# Patient Record
Sex: FEMALE | Race: WHITE
[De-identification: names, ages, dates, MRNs, and addresses within clinical notes are randomized per-mention and may not be internally consistent; named-entity substitution may affect disease eponyms.]

---

## 2018-11-16 NOTE — RAD
DATE: 11/15/2018 5:30 PM



EXAM: MAMMO HERMILA SCREENING BILATERAL



HISTORY: routine screening evaluation. History of bilateral breast reduction 



COMPARISON: Prior mammographic imaging dating back to 6/21/2012



Bilateral full field craniocaudal and mediolateral oblique images were obtained 
using digital technique.



This study was interpreted with the benefit of Computerized Aided Detection (CAD
).



Breast Density: The breast parenchyma shows scattered fibroglandular densities. 
Breast parenchyma level B.



FINDINGS:

Benign calcifications are present. The parenchymal pattern appears stable. 
Changes from prior breast reduction are also grossly stable.



No suspicious masses, microcalcifications or architectural distortion is 
present to suggest malignancy in either breast.



The visualized axillae are unremarkable. 



IMPRESSION: No mammographic evidence of malignancy. 



BI-RADS CATEGORY: 2 BENIGN FINDING(S)



RECOMMENDED FOLLOW-UP: 12M 12 MONTH FOLLOW-UP Annual screening mammography is 
recommended, unless clinically indicated sooner based on symptoms or change in 
physical exam.



PQRS compliance statement: Patient information was entered into a reminder 
system with a target due date 11/17/2019 for the next mammogram.



Mammography is a sensitive method for finding small breast cancers, but it does 
not detect them all and is not a substitute for careful clinical examination. A 
negative mammogram does not negate a clinically suspicious finding and should 
not result in delay in biopsying a clinically suspicious abnormality.



"Our facility is accredited by the [employer] Mammography 
Program."
ISMAELD

## 2020-11-16 NOTE — RAD
DATE: 11/13/2020 2:30 PM



EXAM: DIGITAL SCREEN BILAT W/CAD



HISTORY:  Screening



COMPARISON: 11/15/2018, 12/2/2015, 6/21/2012



Bilateral CC and MLO views of the breasts were performed. Bilateral breast

tomosynthesis was performed in CC and MLO projections.



This study was interpreted with the benefit of Computerized Aided Detection

(CAD).





FINDINGS:



Breast Density: FATTY  The Breast Parenchyma is primarily fatty replaced.

Breast parenchyma level density A.



Stable benign bilateral postreduction changes. 

No suspicious masses, microcalcifications or architectural distortion is

present to suggest malignancy in either breast.





The visualized axillae are unremarkable. 



IMPRESSION: No mammographic evidence of malignancy. 



BI-RADS CATEGORY: 2 BENIGN FINDING(S)



RECOMMENDED FOLLOW-UP: 12M 12 MONTH FOLLOW-UP Annual screening mammography is

recommended, unless clinically indicated sooner based on symptoms or change in

physical exam.



PQRS compliance statement: Patient information was entered into a reminder

system with a target due date for the next mammogram.



Mammography is a sensitive method for finding small breast cancers, but it

does not detect them all and is not a substitute for careful clinical

examination.  A negative mammogram does not negate a clinically suspicious

finding and should not result in delay in biopsying a clinically suspicious

abnormality.



"Our facility is accredited by the [employer] Mammography

Program."

## 2021-11-22 ENCOUNTER — HOSPITAL ENCOUNTER (OUTPATIENT)
Dept: HOSPITAL 63 - MAMMO | Age: 51
End: 2021-11-22
Attending: NURSE PRACTITIONER
Payer: COMMERCIAL

## 2021-11-22 DIAGNOSIS — Z12.31: Primary | ICD-10-CM

## 2021-11-22 PROCEDURE — 77063 BREAST TOMOSYNTHESIS BI: CPT

## 2021-11-22 PROCEDURE — 77067 SCR MAMMO BI INCL CAD: CPT

## 2021-11-22 NOTE — RAD
INDICATION: 51 years of age asymptomatic female patient presents for screening mammography.



TECHNIQUE:  Full field craniocaudal and mediolateral oblique images of both breasts were obtained usi
ng digital technique with tomosynthesis and also analyzed with computer-aided detection software.



COMPARISON: Prior mammographic imaging dating back to  .



BREAST COMPOSITION: Category B: There are scattered fibroglandular densities.



FINDINGS:

Benign calcifications are present.  Benign calcifications are present.



No suspicious masses, microcalcifications or architectural distortion is present to suggest malignanc
y in either breast.



The visualized axillae are unremarkable. 



IMPRESSION: Stable bilateral mammographic imaging, without mammographic evidence of malignancy. 



RECOMMENDATION: Annual screening mammography is recommended, unless clinically indicated sooner based
 on symptoms or change in physical exam.



BIRADS 2: BENIGN 



This study was interpreted with the benefit of Computerized Aided Detection (CAD).



Patient information is entered into the reminder system with a target due date for the next screening
 mammogram.



Mammography is the most sensitive method for finding small breast cancers, but it does not detect the
m all and is not a substitute for careful clinical examination. A negative mammogram does not negate 
a clinically suspicious finding and should not result in delay in biopsying a clinically suspicious a
bnormality.



 "Our facility is accredited by the [employer] Mammography Program."



Electronically signed by: Kumar Watson MD (11/22/2021 3:15 PM) [facility]